# Patient Record
Sex: MALE | Race: WHITE | Employment: FULL TIME | URBAN - METROPOLITAN AREA
[De-identification: names, ages, dates, MRNs, and addresses within clinical notes are randomized per-mention and may not be internally consistent; named-entity substitution may affect disease eponyms.]

---

## 2017-10-10 ENCOUNTER — OFFICE VISIT (OUTPATIENT)
Dept: INTERNAL MEDICINE CLINIC | Age: 47
End: 2017-10-10

## 2017-10-10 VITALS
OXYGEN SATURATION: 98 % | DIASTOLIC BLOOD PRESSURE: 92 MMHG | HEART RATE: 121 BPM | BODY MASS INDEX: 31.98 KG/M2 | SYSTOLIC BLOOD PRESSURE: 132 MMHG | WEIGHT: 249.2 LBS | HEIGHT: 74 IN

## 2017-10-10 DIAGNOSIS — J45.20 MILD INTERMITTENT ASTHMA WITHOUT COMPLICATION: ICD-10-CM

## 2017-10-10 PROCEDURE — 99213 OFFICE O/P EST LOW 20 MIN: CPT | Performed by: INTERNAL MEDICINE

## 2017-10-10 RX ORDER — FLUTICASONE PROPIONATE 110 UG/1
AEROSOL, METERED RESPIRATORY (INHALATION)
Qty: 12 G | Refills: 5 | Status: SHIPPED | OUTPATIENT
Start: 2017-10-10 | End: 2018-09-11 | Stop reason: SDUPTHER

## 2017-10-10 RX ORDER — ALBUTEROL SULFATE 90 UG/1
AEROSOL, METERED RESPIRATORY (INHALATION)
Qty: 8.5 G | Refills: 5 | Status: SHIPPED | OUTPATIENT
Start: 2017-10-10 | End: 2018-12-26 | Stop reason: SDUPTHER

## 2017-10-10 ASSESSMENT — ENCOUNTER SYMPTOMS
CHEST TIGHTNESS: 1
WHEEZING: 1
COUGH: 1
EYES NEGATIVE: 1

## 2018-04-18 ENCOUNTER — HOSPITAL ENCOUNTER (OUTPATIENT)
Dept: MRI IMAGING | Age: 48
Discharge: HOME | End: 2018-04-18
Attending: INTERNAL MEDICINE | Admitting: INTERNAL MEDICINE

## 2018-04-18 ENCOUNTER — OFFICE VISIT (OUTPATIENT)
Dept: INTERNAL MEDICINE CLINIC | Age: 48
End: 2018-04-18

## 2018-04-18 VITALS
SYSTOLIC BLOOD PRESSURE: 116 MMHG | OXYGEN SATURATION: 98 % | BODY MASS INDEX: 32.78 KG/M2 | HEIGHT: 74 IN | DIASTOLIC BLOOD PRESSURE: 86 MMHG | WEIGHT: 255.4 LBS | HEART RATE: 75 BPM

## 2018-04-18 DIAGNOSIS — R51.9 ACUTE INTRACTABLE HEADACHE, UNSPECIFIED HEADACHE TYPE: Primary | ICD-10-CM

## 2018-04-18 DIAGNOSIS — M54.2 NECK PAIN ON LEFT SIDE: ICD-10-CM

## 2018-04-18 DIAGNOSIS — R51.9 ACUTE INTRACTABLE HEADACHE, UNSPECIFIED HEADACHE TYPE: ICD-10-CM

## 2018-04-18 DIAGNOSIS — R51.9 HEADACHE: ICD-10-CM

## 2018-04-18 PROCEDURE — 99213 OFFICE O/P EST LOW 20 MIN: CPT | Performed by: INTERNAL MEDICINE

## 2018-04-18 RX ORDER — METHYLPREDNISOLONE 4 MG/1
TABLET ORAL
Qty: 1 KIT | Refills: 0 | Status: SHIPPED | OUTPATIENT
Start: 2018-04-18 | End: 2019-07-12 | Stop reason: ALTCHOICE

## 2018-04-18 ASSESSMENT — ENCOUNTER SYMPTOMS
RESPIRATORY NEGATIVE: 1
EYES NEGATIVE: 1

## 2018-04-19 ENCOUNTER — TELEPHONE (OUTPATIENT)
Dept: INTERNAL MEDICINE CLINIC | Age: 48
End: 2018-04-19

## 2018-09-11 DIAGNOSIS — J45.20 MILD INTERMITTENT ASTHMA WITHOUT COMPLICATION: ICD-10-CM

## 2018-09-11 RX ORDER — FLUTICASONE PROPIONATE 110 UG/1
AEROSOL, METERED RESPIRATORY (INHALATION)
Qty: 12 G | Refills: 0 | Status: SHIPPED | OUTPATIENT
Start: 2018-09-11 | End: 2018-12-26 | Stop reason: SDUPTHER

## 2018-12-26 DIAGNOSIS — J45.20 MILD INTERMITTENT ASTHMA WITHOUT COMPLICATION: ICD-10-CM

## 2019-12-10 DIAGNOSIS — J45.20 MILD INTERMITTENT ASTHMA WITHOUT COMPLICATION: ICD-10-CM

## 2019-12-10 RX ORDER — FLUTICASONE PROPIONATE 110 UG/1
AEROSOL, METERED RESPIRATORY (INHALATION)
Qty: 12 G | Refills: 2 | Status: SHIPPED | OUTPATIENT
Start: 2019-12-10 | End: 2020-12-30 | Stop reason: SDUPTHER

## 2021-02-25 ENCOUNTER — OFFICE VISIT (OUTPATIENT)
Dept: PRIMARY CARE CLINIC | Age: 51
End: 2021-02-25
Payer: COMMERCIAL

## 2021-02-25 DIAGNOSIS — Z20.822 SUSPECTED COVID-19 VIRUS INFECTION: Primary | ICD-10-CM

## 2021-02-25 PROCEDURE — 99211 OFF/OP EST MAY X REQ PHY/QHP: CPT | Performed by: NURSE PRACTITIONER

## 2021-02-25 NOTE — PROGRESS NOTES
Petty Harding received a viral test for COVID-19. They were educated on isolation and quarantine as appropriate. For any symptoms, they were directed to seek care from their PCP, given contact information to establish with a doctor, directed to an urgent care or the emergency room.

## 2021-02-26 LAB — SARS-COV-2: DETECTED

## 2021-09-07 ENCOUNTER — E-VISIT (OUTPATIENT)
Dept: PRIMARY CARE CLINIC | Age: 51
End: 2021-09-07

## 2021-09-07 DIAGNOSIS — M25.522 ELBOW PAIN, LEFT: Primary | ICD-10-CM

## 2021-09-07 PROCEDURE — 99423 OL DIG E/M SVC 21+ MIN: CPT | Performed by: INTERNAL MEDICINE

## 2021-09-07 RX ORDER — METHYLPREDNISOLONE 4 MG/1
TABLET ORAL
Qty: 1 KIT | Refills: 0 | Status: SHIPPED | OUTPATIENT
Start: 2021-09-07 | End: 2021-09-13

## 2021-09-07 RX ORDER — CLOBETASOL PROPIONATE 0.5 MG/G
OINTMENT TOPICAL
Qty: 60 G | Refills: 2 | Status: SHIPPED | OUTPATIENT
Start: 2021-09-07 | End: 2021-09-28 | Stop reason: CLARIF

## 2021-09-07 NOTE — PROGRESS NOTES
Please get an x-ray of your left elbow to make sure you did not break anything. I will start you on a Medrol Dosepak to help reduce the inflammation in the area. I will also start you on a topical steroid cream to use twice a day for a week or 2. If there is subcutaneous bleeding then this raised area is probably old blood that has been retained underneath the skin. Please have someone take a direct look at this to see if the area needs more attention. Follow-up with your primary care physician for further suggestions or referral to orthopedics if needed. 21+ minutes were spent on completion of this E visit.

## 2021-09-28 ENCOUNTER — TELEPHONE (OUTPATIENT)
Dept: ORTHOPEDIC SURGERY | Age: 51
End: 2021-09-28

## 2021-09-28 NOTE — TELEPHONE ENCOUNTER
I called and left message letting him know that the information was sent in the mail today but it takes 2 extra days for the mail to go out.

## 2021-10-13 ENCOUNTER — OFFICE VISIT (OUTPATIENT)
Dept: ORTHOPEDIC SURGERY | Age: 51
End: 2021-10-13
Payer: COMMERCIAL

## 2021-10-13 ENCOUNTER — TELEPHONE (OUTPATIENT)
Dept: ORTHOPEDIC SURGERY | Age: 51
End: 2021-10-13

## 2021-10-13 VITALS
DIASTOLIC BLOOD PRESSURE: 80 MMHG | BODY MASS INDEX: 30.16 KG/M2 | WEIGHT: 235 LBS | SYSTOLIC BLOOD PRESSURE: 130 MMHG | HEIGHT: 74 IN | HEART RATE: 77 BPM

## 2021-10-13 DIAGNOSIS — M25.522 LEFT ELBOW PAIN: Primary | ICD-10-CM

## 2021-10-13 DIAGNOSIS — M10.9 GOUT OF LEFT ELBOW, UNSPECIFIED CAUSE, UNSPECIFIED CHRONICITY: ICD-10-CM

## 2021-10-13 DIAGNOSIS — M70.22 OLECRANON BURSITIS OF LEFT ELBOW: ICD-10-CM

## 2021-10-13 LAB
APPEARANCE FLUID: NORMAL
CELL COUNT FLUID TYPE: NORMAL
CLOT EVALUATION: NORMAL
COLOR FLUID: NORMAL
CRYSTALS, FLUID: NORMAL
LYMPHOCYTES, BODY FLUID: 18 %
NEUTROPHIL, FLUID: 82 %
NUCLEATED CELLS FLUID: 44 /CUMM
NUMBER OF CELLS COUNTED FLUID: 100
RBC FLUID: 6000 /CUMM
SOURCE BODY FLUID: NORMAL
VOLUME: 5 ML

## 2021-10-13 PROCEDURE — 20606 DRAIN/INJ JOINT/BURSA W/US: CPT | Performed by: ORTHOPAEDIC SURGERY

## 2021-10-13 PROCEDURE — 99203 OFFICE O/P NEW LOW 30 MIN: CPT | Performed by: ORTHOPAEDIC SURGERY

## 2021-10-13 RX ORDER — LIDOCAINE HYDROCHLORIDE 10 MG/ML
2 INJECTION, SOLUTION INFILTRATION; PERINEURAL ONCE
Status: COMPLETED | OUTPATIENT
Start: 2021-10-13 | End: 2021-10-13

## 2021-10-13 RX ADMIN — LIDOCAINE HYDROCHLORIDE 2 ML: 10 INJECTION, SOLUTION INFILTRATION; PERINEURAL at 09:00

## 2021-10-13 NOTE — PATIENT INSTRUCTIONS
Impression:   1. Onset of gout left olecranon bursa is most likely diagnosis. No other findings consistent with infection. 2.  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 Plan/Treatment:   1. Her to obtain specimen I did times the area just proximal to the olecranon bursa with 3 cc of 1% Xylocaine. 2.  Utilizing the ultrasound for visualization and needle was introduced into the area of the bursa and filled with 6 mL of saline. The bursa was then aspirated obtaining approximately 3 cc and return. The rest of the saline dissipated into the tissues. 3.  The specimen is sent to [facility] lab for cell count differential crystal analysis and culture. 4.  A prescription for a blood uric acid level was given to the patient. He is to take this to the lab or his primary care physician to have blood drawn. 5.  I will contact him in 48 hours with results. If gout is verified he will need to start on allopurinol.       Daniel Maya MD  10/13/2021

## 2021-10-13 NOTE — TELEPHONE ENCOUNTER
I called hematology to see if they would spin down the specimen that I sent from Eladio's elbow ( which was mostly saline) and check the button for crystals.   Elizabeth Metzger

## 2021-10-13 NOTE — PROGRESS NOTES
Initial Elbow Evaluation                                                10/13/2021  Tess Collet     1970       History of Present Illness:    Veronica Tyler is seen for Elbow Injury (Left elbow injury  4 weeks ago. Hit a wall at home )     Veronica Tyler is a 52-year-old gentleman sent by Dr. Danni Morales for evaluation of a mass at the olecranon bursa of his left elbow. He states that he bumped and slightly abraded his left elbow about 4 weeks ago and subsequently had a flareup of swelling which involved the posterior aspect of his left elbow and his entire forearm. He was placed on a Medrol Dosepak by Dr. Jaycob Duncan which was efficacious in reducing the swelling however he has had persistence of mass at the olecranon and another small mass just distal to the olecranon. He denies any fevers, chills, drainage or red streaks going up his arm. He denies any swollen lymph glands. He denies any concomitant infections elsewhere. He also denies any history of gout or family history of gout. He does complain of intermittent ache with no pain at rest but pain up to 1 with activities. There is no distal numbness or weakness in his left upper extremity. I have today reviewed with Tess Collet the clinically relevant past medical history, medications, allergies, family history, and Review of Systems from the patients most recent history form, and I have documented any details relevant to today's presenting complaints in my history above. Mr. Jenn Morales's self-reported past medical history, medications, allergies, family history, and Review of System have been scanned into the chart under the \"Media\" tab.      /80   Pulse 77   Ht 6' 2\" (1.88 m)   Wt 235 lb (106.6 kg)   BMI 30.17 kg/m²     Physical Examination:  General Appearance: no acute distress, alert, oriented x 3  Ecchymosis: no   Errythemia: no   Swelling: yes - slight  Palpation/Tenderness:  Non tender   Olecranon Bursal Effusion: firm mass olecranon vanessa. Plan/Treatment:   1. In order to obtain specimen I did anesthetize  the area just proximal to the olecranon bursa with 3 cc of 1% Xylocaine. 2.  Utilizing the ultrasound for visualization, and needle was introduced into the area of the bursa and filled with 6 mL of saline. The bursa was then aspirated obtaining approximately 3 cc in return. The rest of the saline dissipated into the tissues. 3.  The specimen is sent to [facility] lab for cell count differential crystal analysis and culture. 4.  A prescription for a blood uric acid level was given to the patient. He is to take this to the lab or his primary care physician to have blood drawn. 5.  I will contact him in 48 hours with results. If gout is verified he will need to start on allopurinol. Lakesha Chen MD  10/13/2021    This dictation was done with Gini dictation and may contain mechanical errors related to translation.

## 2021-10-15 ENCOUNTER — TELEPHONE (OUTPATIENT)
Dept: ORTHOPEDIC SURGERY | Age: 51
End: 2021-10-15

## 2021-10-15 DIAGNOSIS — M10.022 ACUTE IDIOPATHIC GOUT OF LEFT ELBOW: Primary | ICD-10-CM

## 2021-10-15 RX ORDER — ALLOPURINOL 300 MG/1
300 TABLET ORAL DAILY
Qty: 30 TABLET | Refills: 3 | Status: SHIPPED | OUTPATIENT
Start: 2021-10-15

## 2021-10-15 NOTE — TELEPHONE ENCOUNTER
I did call Fitz Hutton and leave a message concerning his test performed Wednesday, 10/13/2021. The aspiration of the mass from his left elbow unfortunately did not show significant findings. Cultures are still pending but the cell count was completely benign and no crystals were seen on spun down specimen. His blood uric acid level was high normal at 6.0 so could represent a gout. And the mass could be a tophus. Because the results are borderline and because Fitz Phlenancy is having very little symptoms I have decided to delay treatment. I have recommended that he repeat the blood uric acid level in 4 weeks and come back in for repeat exam prior to initiating any treatment. An order is left with the lab for his feet blood uric acid level. The cultures from the specimen are of course still pending. If anything comes from the cultures I will call them early.   Christopher Gillis

## 2021-10-16 LAB
BODY FLUID CULTURE, STERILE: NORMAL
GRAM STAIN RESULT: NORMAL

## 2021-12-07 ENCOUNTER — TELEPHONE (OUTPATIENT)
Dept: ORTHOPEDIC SURGERY | Age: 51
End: 2021-12-07

## 2021-12-07 NOTE — TELEPHONE ENCOUNTER
General Question     Subject: PATIENT WOULD LIKE TO KNOW IF HE NEEDS TO GET AN ORDER FOR BLOODWORK TO BE COMPLETED PRIOR TO HIS OFFICE VISIT. PLEASE ADVISE PATIENT.     Patient:  Eduardo Mcdonald  Contact Number: [phone]

## 2021-12-07 NOTE — TELEPHONE ENCOUNTER
I told patient that there is already an order in the [address] [place] [address] Rd system for him to get the Uric Acid repeated before his next apt. I told him to go to the [facility] lab a few days before his next apt so we will have the results.

## 2021-12-15 ENCOUNTER — OFFICE VISIT (OUTPATIENT)
Dept: ORTHOPEDIC SURGERY | Age: 51
End: 2021-12-15
Payer: COMMERCIAL

## 2021-12-15 VITALS — HEIGHT: 74 IN | BODY MASS INDEX: 30.16 KG/M2 | WEIGHT: 235 LBS

## 2021-12-15 DIAGNOSIS — M10.022 ACUTE IDIOPATHIC GOUT OF LEFT ELBOW: Primary | ICD-10-CM

## 2021-12-15 PROCEDURE — 99213 OFFICE O/P EST LOW 20 MIN: CPT | Performed by: ORTHOPAEDIC SURGERY

## 2021-12-15 NOTE — PROGRESS NOTES
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 He states he is having no pain. He denies any numbness in the left hand including ulnar nerve distribution. He states the size of the mass is decreasing. He has been taking allopurinol 300 mg daily. At last office visit he had attempted aspiration of the tophus. I was unable to get any fluid so I injected saline and aspirated the saline. This was sent off to the lab. The specimen unsurprisingly showed no uric acid crystals  The cell count and differential showed primarily blood with 6000 RBCs and only 44 white blood cells of which 82% were neutrophils. The cultures of the same specimen showed no growth at 72 hours. X-rays done at last office visit showed no evidence of calcific mass. The ultrasound done on the same date did show crystalline mass consistent with uric acid. I have today reviewed with Jade Espinosa the clinically relevant past medical history, medications, allergies, family history, and Review of Systems from the patients most recent history form, and I have documented any details relevant to today's presenting complaints in my history above. Mr. Kristian Morales's self-reported past medical history, medications, allergies, family history, and Review of System have been scanned into the chart under the \"Media\" tab. Ht 6' 2\" (1.88 m)   Wt 235 lb (106.6 kg)   BMI 30.17 kg/m²      Physical Examination:    General Appearance: no acute distress, alert, oriented x 3, appropriate mood and affect  Ecchymosis: no   Errythemia: no   Swelling: Swelling only at the area of previous gout with no swelling proximally or distally.   Palpation/Tenderness:no  Olecranon Bursal Effusion: none  Crepitus: Negative masses:  Atrophy: Negative deformity:   Instability: Small remaining mass at olecranon bursa. This is smaller than last office visit. Neurovascular Status: The sensory and motor exam is normal for radial ulnar and median nerve in left hand. Lab Findings:  Uric Acid done on December 9, 2021 is 4.1  -this is down from 6.0 done on October 13, 2021. Impression:   1. 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 2.  This gouty tophus occurred despite having only a high normal uric acid level. 3.  Much improved blood uric acid level - now down to low normal after taking allopurinol for the last 2 months. Plan/Treatment:   1. Prognosis and future treatment plan was discussed with the patient. 2.  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 3.  Dietary considerations were discussed. 4.  I would recommend that he stay on allopurinol for the indefinite future. He may consider dropping his dosage from his current 300 to a low dose such as 200 mg/day. 5.  However I will leave for future decisions concerning treatment for this disease up to his primary care physician Dr. Nahed Trevino. Damaris Guerrero MD  12/15/2021    This dictation was done with Dragon dictation and may contain mechanical errors related to translation.

## 2021-12-15 NOTE — PATIENT INSTRUCTIONS
Impression:   1. 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 2.  This gouty tophus occurred despite having only a high normal uric acid level. 3.  Much improved blood uric acid level now down to low normal after taking allopurinol for the last 2 months. Plan/Treatment:   1. Prognosis and future treatment plan was discussed with the patient. 2.  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 3.  Dietary considerations were discussed. 4.  I would recommend that he stay on allopurinol for the indefinite future. He may consider dropping his dosage from his current 300 to a low dose such as 200 mg/day. 5.  However I will leave for future decisions concerning treatment for this disease up to his primary care physician Dr. Cozette Cooks.       Arcelia Enriquez MD  12/15/2021

## 2023-08-30 ENCOUNTER — TELEPHONE (OUTPATIENT)
Dept: FAMILY MEDICINE CLINIC | Age: 53
End: 2023-08-30

## 2023-08-30 NOTE — TELEPHONE ENCOUNTER
----- Message from Bart Joy sent at 8/30/2023 10:07 AM EDT -----  Subject: Appointment Request    Reason for Call: New Patient/New to Provider Appointment needed: New   Patient Request Appointment    QUESTIONS    Reason for appointment request? Requested Provider unavailable - Otilia Mcnair     Additional Information for Provider?  Pt is looking to [facility] care.   ---------------------------------------------------------------------------  --------------  Jose Nolasco INFO  [phone]; OK to leave message on voicemail  ---------------------------------------------------------------------------  --------------  SCRIPT ANSWERS

## 2023-08-31 NOTE — TELEPHONE ENCOUNTER
Ken Malik states he would like to est with Dr Hemanth Tolentino that he has a friend that sees him.  Please advise if ok to est

## 2023-11-13 SDOH — HEALTH STABILITY: PHYSICAL HEALTH: ON AVERAGE, HOW MANY MINUTES DO YOU ENGAGE IN EXERCISE AT THIS LEVEL?: 40 MIN

## 2023-11-13 SDOH — HEALTH STABILITY: PHYSICAL HEALTH: ON AVERAGE, HOW MANY DAYS PER WEEK DO YOU ENGAGE IN MODERATE TO STRENUOUS EXERCISE (LIKE A BRISK WALK)?: 3 DAYS

## 2023-11-13 ASSESSMENT — SOCIAL DETERMINANTS OF HEALTH (SDOH)
WITHIN THE LAST YEAR, HAVE YOU BEEN KICKED, HIT, SLAPPED, OR OTHERWISE PHYSICALLY HURT BY YOUR PARTNER OR EX-PARTNER?: NO
WITHIN THE LAST YEAR, HAVE YOU BEEN AFRAID OF YOUR PARTNER OR EX-PARTNER?: NO
WITHIN THE LAST YEAR, HAVE TO BEEN RAPED OR FORCED TO HAVE ANY KIND OF SEXUAL ACTIVITY BY YOUR PARTNER OR EX-PARTNER?: NO
WITHIN THE LAST YEAR, HAVE YOU BEEN HUMILIATED OR EMOTIONALLY ABUSED IN OTHER WAYS BY YOUR PARTNER OR EX-PARTNER?: NO

## 2023-11-15 ENCOUNTER — OFFICE VISIT (OUTPATIENT)
Dept: FAMILY MEDICINE CLINIC | Age: 53
End: 2023-11-15
Payer: COMMERCIAL

## 2023-11-15 ENCOUNTER — PATIENT MESSAGE (OUTPATIENT)
Dept: FAMILY MEDICINE CLINIC | Age: 53
End: 2023-11-15

## 2023-11-15 VITALS
WEIGHT: 273 LBS | BODY MASS INDEX: 35.04 KG/M2 | RESPIRATION RATE: 18 BRPM | HEART RATE: 86 BPM | SYSTOLIC BLOOD PRESSURE: 126 MMHG | HEIGHT: 74 IN | OXYGEN SATURATION: 97 % | DIASTOLIC BLOOD PRESSURE: 84 MMHG

## 2023-11-15 DIAGNOSIS — Z23 NEEDS FLU SHOT: ICD-10-CM

## 2023-11-15 DIAGNOSIS — Z12.11 COLON CANCER SCREENING: ICD-10-CM

## 2023-11-15 DIAGNOSIS — R73.03 PRE-DIABETES: ICD-10-CM

## 2023-11-15 DIAGNOSIS — Z23 IMMUNIZATION DUE: ICD-10-CM

## 2023-11-15 DIAGNOSIS — Z00.00 ROUTINE GENERAL MEDICAL EXAMINATION AT A HEALTH CARE FACILITY: Primary | ICD-10-CM

## 2023-11-15 DIAGNOSIS — J45.20 MILD INTERMITTENT ASTHMA WITHOUT COMPLICATION: ICD-10-CM

## 2023-11-15 DIAGNOSIS — Z12.5 SCREENING PSA (PROSTATE SPECIFIC ANTIGEN): ICD-10-CM

## 2023-11-15 DIAGNOSIS — Z13.220 SCREENING, LIPID: ICD-10-CM

## 2023-11-15 LAB
ANION GAP SERPL CALCULATED.3IONS-SCNC: 11 MMOL/L (ref 3–16)
BUN SERPL-MCNC: 11 MG/DL (ref 7–20)
CALCIUM SERPL-MCNC: 9.5 MG/DL (ref 8.3–10.6)
CHLORIDE SERPL-SCNC: 105 MMOL/L (ref 99–110)
CHOLEST SERPL-MCNC: 137 MG/DL (ref 0–199)
CO2 SERPL-SCNC: 23 MMOL/L (ref 21–32)
CREAT SERPL-MCNC: 1.1 MG/DL (ref 0.9–1.3)
GFR SERPLBLD CREATININE-BSD FMLA CKD-EPI: >60 ML/MIN/{1.73_M2}
GLUCOSE SERPL-MCNC: 95 MG/DL (ref 70–99)
HDLC SERPL-MCNC: 51 MG/DL (ref 40–60)
LDLC SERPL CALC-MCNC: 69 MG/DL
POTASSIUM SERPL-SCNC: 4.4 MMOL/L (ref 3.5–5.1)
PSA SERPL DL<=0.01 NG/ML-MCNC: 0.68 NG/ML (ref 0–4)
SODIUM SERPL-SCNC: 139 MMOL/L (ref 136–145)
TRIGL SERPL-MCNC: 83 MG/DL (ref 0–150)
VLDLC SERPL CALC-MCNC: 17 MG/DL

## 2023-11-15 PROCEDURE — 36415 COLL VENOUS BLD VENIPUNCTURE: CPT | Performed by: FAMILY MEDICINE

## 2023-11-15 PROCEDURE — 90715 TDAP VACCINE 7 YRS/> IM: CPT | Performed by: FAMILY MEDICINE

## 2023-11-15 PROCEDURE — 90472 IMMUNIZATION ADMIN EACH ADD: CPT | Performed by: FAMILY MEDICINE

## 2023-11-15 PROCEDURE — 90471 IMMUNIZATION ADMIN: CPT | Performed by: FAMILY MEDICINE

## 2023-11-15 PROCEDURE — 99386 PREV VISIT NEW AGE 40-64: CPT | Performed by: FAMILY MEDICINE

## 2023-11-15 PROCEDURE — 90674 CCIIV4 VAC NO PRSV 0.5 ML IM: CPT | Performed by: FAMILY MEDICINE

## 2023-11-15 RX ORDER — ALBUTEROL SULFATE 90 UG/1
2 AEROSOL, METERED RESPIRATORY (INHALATION) EVERY 6 HOURS PRN
Qty: 8.5 G | Refills: 1 | Status: SHIPPED | OUTPATIENT
Start: 2023-11-15

## 2023-11-15 RX ORDER — FLUTICASONE PROPIONATE 110 UG/1
AEROSOL, METERED RESPIRATORY (INHALATION)
Qty: 12 G | Refills: 11 | Status: SHIPPED | OUTPATIENT
Start: 2023-11-15

## 2023-11-15 SDOH — ECONOMIC STABILITY: FOOD INSECURITY: WITHIN THE PAST 12 MONTHS, THE FOOD YOU BOUGHT JUST DIDN'T LAST AND YOU DIDN'T HAVE MONEY TO GET MORE.: NEVER TRUE

## 2023-11-15 SDOH — ECONOMIC STABILITY: INCOME INSECURITY: HOW HARD IS IT FOR YOU TO PAY FOR THE VERY BASICS LIKE FOOD, HOUSING, MEDICAL CARE, AND HEATING?: NOT HARD AT ALL

## 2023-11-15 SDOH — ECONOMIC STABILITY: HOUSING INSECURITY
IN THE LAST 12 MONTHS, WAS THERE A TIME WHEN YOU DID NOT HAVE A STEADY PLACE TO SLEEP OR SLEPT IN A SHELTER (INCLUDING NOW)?: NO

## 2023-11-15 SDOH — ECONOMIC STABILITY: FOOD INSECURITY: WITHIN THE PAST 12 MONTHS, YOU WORRIED THAT YOUR FOOD WOULD RUN OUT BEFORE YOU GOT MONEY TO BUY MORE.: NEVER TRUE

## 2023-11-15 ASSESSMENT — PATIENT HEALTH QUESTIONNAIRE - PHQ9
2. FEELING DOWN, DEPRESSED OR HOPELESS: 0
SUM OF ALL RESPONSES TO PHQ9 QUESTIONS 1 & 2: 0
SUM OF ALL RESPONSES TO PHQ QUESTIONS 1-9: 0
1. LITTLE INTEREST OR PLEASURE IN DOING THINGS: 0

## 2023-11-15 NOTE — TELEPHONE ENCOUNTER
From: Tiffanie Decker  To: Dr. Imelda Umanzor: 11/15/2023 9:30 AM EST  Subject: Eczema    Dr. Bart Sharma,    During our visit this morning, I forgot to mention an eczema flare up that I'm experiencing on my upper arms. I had discussed with Maggie but forgot to mention it to you during our discussion. I haven't had a flare up for many years and this current one has lasted a few weeks.     Thanks,  Marolyn Lefort

## 2023-11-15 NOTE — PROGRESS NOTES
11/15/2023    Yeni Cantrell (:  1970) is a 47 y.o. male, here for evaluation of the following chief complaint(s):  Established New Doctor (Former Dr Jacy Fontenot patient)      ASSESSMENT/PLAN:     Diagnosis Orders   1. Routine general medical examination at a health care facility        2. Screening PSA (prostate specific antigen)  PSA Screening      3. Screening, lipid  LIPID PANEL      4. Pre-diabetes  Basic Metabolic Panel    Hemoglobin A1C      5. Mild intermittent asthma without complication  albuterol sulfate HFA (PROVENTIL;VENTOLIN;PROAIR) 108 (90 Base) MCG/ACT inhaler    fluticasone (FLOVENT HFA) 110 MCG/ACT inhaler    OK INhaled Tx continue      6. Colon cancer screening  [facility] - Jalil Garcia MD, Gastroenterology, [facility]      7. Immunization due  Tdap, BOOSTRIX, (age 8 yrs+), IM      8. Needs flu shot  Influenza, FLUCELVAX, (age 10 mo+), IM, Preservative Free, 0.5 mL          No follow-ups on file. An electronic signature was used to authenticate this note.     SUBJECTIVE/OBJECTIVE:  (NOTE : prior results listed below reviewed at this visit to assist in medical decision making.)    HPI / ROS    # Preventive and other issues  # new patient former PCP different specialty    # PSA screening history:  No results found for: \"PSA\", \"PSADIA\"    # Lipids - recent screening history:  Lab Results   Component Value Date    LDLCALC 55 02/15/2012     Lab Results   Component Value Date    TRIG 95 02/15/2012     Lab Results   Component Value Date    HDL 51 02/15/2012     Lab Results   Component Value Date    CHOL 125 02/15/2012         Wt Readings from Last 3 Encounters:   11/15/23 123.8 kg (273 lb)   12/15/21 106.6 kg (235 lb)   10/13/21 106.6 kg (235 lb)       BP Readings from Last 3 Encounters:   11/15/23 126/84   10/13/21 130/80   21 136/84       PHYSICAL EXAM  Vitals:    11/15/23 0752   BP: 126/84   Pulse: 86   Resp: 18   SpO2: 97%   Weight: 123.8 kg (273 lb)   Height: 1.88 m (6' 2\")

## 2023-11-16 LAB
EST. AVERAGE GLUCOSE BLD GHB EST-MCNC: 99.7 MG/DL
HBA1C MFR BLD: 5.1 %

## 2023-11-16 RX ORDER — METHYLPREDNISOLONE 4 MG/1
TABLET ORAL
Qty: 1 KIT | Refills: 0 | Status: SHIPPED | OUTPATIENT
Start: 2023-11-16 | End: 2023-11-21

## 2024-03-04 ENCOUNTER — TELEMEDICINE (OUTPATIENT)
Dept: FAMILY MEDICINE CLINIC | Age: 54
End: 2024-03-04
Payer: COMMERCIAL

## 2024-03-04 DIAGNOSIS — L30.9 DERMATITIS: Primary | ICD-10-CM

## 2024-03-04 PROCEDURE — 99422 OL DIG E/M SVC 11-20 MIN: CPT | Performed by: NURSE PRACTITIONER

## 2024-03-04 RX ORDER — TRIAMCINOLONE ACETONIDE 1 MG/G
OINTMENT TOPICAL 2 TIMES DAILY
Qty: 15 G | Refills: 0 | Status: SHIPPED | OUTPATIENT
Start: 2024-03-04 | End: 2024-03-11

## 2024-03-04 RX ORDER — METHYLPREDNISOLONE 4 MG/1
TABLET ORAL
Qty: 1 KIT | Refills: 0 | Status: SHIPPED | OUTPATIENT
Start: 2024-03-04 | End: 2024-03-10

## 2024-03-04 NOTE — PROGRESS NOTES
Eladio Morales, was evaluated through a synchronous (real-time) audio-video encounter. The patient (or guardian if applicable) is aware that this is a billable service, which includes applicable co-pays. This Virtual Visit was conducted with patient's (and/or legal guardian's) consent. Patient identification was verified, and a caregiver was present when appropriate.   The patient was located at Home: [address] [place] [id number]  Provider was located at Facility (Appt Dept): [address] [place] [address], [facility]  [place] [phone]      Eladio Morales (:  1970) is a Established patient, presenting virtually for evaluation of the following: skin rash    Assessment & Plan   Below is the assessment and plan developed based on review of pertinent history, physical exam, labs, studies, and medications.  1. Dermatitis  -     methylPREDNISolone (MEDROL DOSEPACK) 4 MG tablet; Take by mouth., Disp-1 kit, R-0Normal  -     triamcinolone (KENALOG) 0.1 % ointment; Apply topically 2 times daily for 7 days, Topical, 2 TIMES DAILY Starting Mon 3/4/2024, Until Mon 3/11/2024, For 7 days, Disp-15 g, R-0, Normal    No follow-ups on file.       Subjective   HPI    Patient presents today for skin rash on both side of his head near his temples. States it appeared after he got his hair cut this weekend. States the areas itch with lesion. No drainage from areas. Denies swelling or pain at the areas. Denies otc medication that he has tried.     Review of Systems   See HPI    Objective   Patient-Reported Vitals  No data recorded     Physical Exam  [INSTRUCTIONS:  \"[x]\" Indicates a positive item  \"[]\" Indicates a negative item  -- DELETE ALL ITEMS NOT EXAMINED]    Constitutional: [x] Appears well-developed and well-nourished [x] No apparent distress      [] Abnormal -     Mental status: [x] Alert and awake  [x] Oriented to person/place/time [x] Able to follow commands    [] Abnormal -     Eyes:   EOM    [x]  Normal    [] Abnormal -

## 2024-03-19 ENCOUNTER — PATIENT MESSAGE (OUTPATIENT)
Dept: FAMILY MEDICINE CLINIC | Age: 54
End: 2024-03-19

## 2024-03-19 NOTE — TELEPHONE ENCOUNTER
From: Eladio Morales  To: Dr. Erick Palomares  Sent: 3/19/2024 4:08 PM EDT  Subject: Flovent HFA    With Flovent HFA being discontinued, what are my options for a replacement \"maintenance\" asthma prescription?    Thanks,  Charles Morales

## 2024-03-21 RX ORDER — FLUTICASONE PROPIONATE 110 UG/1
1 AEROSOL, METERED RESPIRATORY (INHALATION) 2 TIMES DAILY
Qty: 12 G | Refills: 3 | Status: SHIPPED | OUTPATIENT
Start: 2024-03-21

## 2024-03-29 ENCOUNTER — TELEPHONE (OUTPATIENT)
Dept: ADMINISTRATIVE | Age: 54
End: 2024-03-29

## 2024-03-29 NOTE — TELEPHONE ENCOUNTER
Submitted PA for Fluticasone Propionate HFA 110MCG/ACT aerosol   Via CMM Key: [id number] STATUS: NOT SENT    DX and ICD10 Code Needed    If this requires a response please respond to the pool ( P MHCX PSC MEDICATION PRE-AUTH).      Thank you please advise patient.

## 2024-04-01 NOTE — TELEPHONE ENCOUNTER
Diagnosis is asthma  Can you follow through on this  Last seen for this 11/23  Mild Intermittent Asthma; albuterol use > 2x/week  Needed steroid inhaler add on

## 2024-04-01 NOTE — TELEPHONE ENCOUNTER
Submitted PA for Fluticasone Propionate HFA 110MCG/ACT aerosol   Via CMM Key: [id number]  STATUS: PENDING.    Follow up done daily; if no decision with in three days we will refax.  If another three days goes by with no decision will call the insurance for status.

## 2024-04-02 NOTE — TELEPHONE ENCOUNTER
The medication was DENIED;     Request Reference Number: [id number]. FLUTICAS HFA AER 110MCG is denied due to Plan Exclusion. For further questions, call [phone].     If you want an APPEAL; E- Appeal is available in [facility]. Left case open.  please note in this encounter what new information you would like to APPEAL with.  Once complete route back to PA POOL.    If this requires a response please respond to the pool ( P MHCX PSC MEDICATION PRE-AUTH).      Thank you please advise patient.

## 2024-04-02 NOTE — TELEPHONE ENCOUNTER
Called Charles and discussed plan does not cover flovent. He will call to see what is covered in plan

## 2024-04-03 RX ORDER — FLUTICASONE FUROATE 100 UG/1
1 POWDER RESPIRATORY (INHALATION) DAILY
Qty: 30 EACH | Refills: 3 | Status: SHIPPED | OUTPATIENT
Start: 2024-04-03

## 2024-08-23 ENCOUNTER — PATIENT MESSAGE (OUTPATIENT)
Dept: FAMILY MEDICINE CLINIC | Age: 54
End: 2024-08-23

## 2024-08-23 DIAGNOSIS — L30.9 DERMATITIS: Primary | ICD-10-CM

## 2024-08-23 RX ORDER — METHYLPREDNISOLONE 4 MG/1
TABLET ORAL
Qty: 1 KIT | Refills: 0 | Status: SHIPPED | OUTPATIENT
Start: 2024-08-23 | End: 2024-08-29

## 2024-08-23 RX ORDER — TRIAMCINOLONE ACETONIDE 1 MG/G
OINTMENT TOPICAL 2 TIMES DAILY
Qty: 30 G | Refills: 0 | Status: SHIPPED | OUTPATIENT
Start: 2024-08-23 | End: 2024-08-30

## 2024-11-22 DIAGNOSIS — L30.9 DERMATITIS: ICD-10-CM

## 2024-11-22 DIAGNOSIS — J45.20 MILD INTERMITTENT ASTHMA WITHOUT COMPLICATION: ICD-10-CM

## 2024-11-25 RX ORDER — TRIAMCINOLONE ACETONIDE 1 MG/G
OINTMENT TOPICAL
Qty: 30 G | Refills: 0 | Status: SHIPPED | OUTPATIENT
Start: 2024-11-25

## 2024-11-25 RX ORDER — ALBUTEROL SULFATE 90 UG/1
2 INHALANT RESPIRATORY (INHALATION) EVERY 6 HOURS PRN
Qty: 8.5 G | Refills: 1 | Status: SHIPPED | OUTPATIENT
Start: 2024-11-25

## 2024-11-25 RX ORDER — METHYLPREDNISOLONE 4 MG/1
TABLET ORAL
Qty: 21 TABLET | Refills: 0 | Status: SHIPPED | OUTPATIENT
Start: 2024-11-25

## 2025-04-08 NOTE — TELEPHONE ENCOUNTER
Last ov 3/4/24 vv  prior last physical was nov 2023    next not scheduled.    VM left to call office to set up physical

## 2025-04-09 RX ORDER — FLUTICASONE FUROATE 100 UG/1
1 POWDER RESPIRATORY (INHALATION) DAILY
Qty: 30 EACH | Refills: 0 | Status: SHIPPED | OUTPATIENT
Start: 2025-04-09

## 2025-08-29 RX ORDER — FLUTICASONE FUROATE 100 UG/1
1 POWDER RESPIRATORY (INHALATION) DAILY
Qty: 30 EACH | Refills: 0 | Status: SHIPPED | OUTPATIENT
Start: 2025-08-29